# Patient Record
(demographics unavailable — no encounter records)

---

## 2024-11-27 NOTE — ASSESSMENT
[FreeTextEntry1] : Samuel Schnitzler is a 20 y/o M with history of medical weight loss who presents today for an initial consultation interested in bariatric surgery. Referred by pediatrician who has been following him aggressively for a long time. Patient states that he was previously on Ozempic about 3 years ago, for 4-5 months. Starting weight was 320 lbs, patient managed to lose 30 lbs with medical weight loss,and today weighs 313 lbs. Patient denies alcohol intake and smoking. Well educated and has a strong support system. We discussed at length surgical and non-surgical options and that non-surgical approaches are unlikely to lead to long term, sustained weight loss. We also discussed that surgery alone is unlikely to be successful but should rather be seen as a tool for weight loss to be integrated with physical activity and nutritional counseling. All risks of surgery were explained to the patient including the risks of leaks, infection, blood clots, and death. We will begin bariatric workup with blood work including testosterone and insulin to move forward with possible VSG.

## 2024-11-27 NOTE — HISTORY OF PRESENT ILLNESS
[de-identified] : Samuel Schnitzler is a 20 y/o M with history of medical weight loss who presents today for an initial consultation interested in bariatric surgery. Referred by pediatrician who has been following him aggressively for a long time. Patient states that he was previously on Ozempic about 3 years ago, for 4-5 months. Starting weight was 320 lbs, patient managed to lose 30 lbs with medical weight loss,and today weighs 313 lbs. Patient denies alcohol intake and smoking. Well educated and has a strong support system. We discussed at length surgical and non-surgical options and that non-surgical approaches are unlikely to lead to long term, sustained weight loss. We also discussed that surgery alone is unlikely to be successful but should rather be seen as a tool for weight loss to be integrated with physical activity and nutritional counseling. All risks of surgery were explained to the patient including the risks of leaks, infection, blood clots, and death. We will begin bariatric workup with blood work including testosterone and insulin to move forward with possible VSG.

## 2024-11-27 NOTE — PLAN
[FreeTextEntry1] : We will begin bariatric workup with blood work including testosterone and insulin to move forward with possible VSG.

## 2025-01-22 NOTE — HISTORY OF PRESENT ILLNESS
[Home] : at home, [unfilled] , at the time of the visit. [Medical Office: ([facility])___] : at the medical office located in  [de-identified] : Mr. Schnitzler presents today for a final visit for laparoscopic sleeve gastrectomy scheduled on 2/4/25. Alternatives, risks and benefits discussed. UGI reviewed, no contraindications to proposed surgery. Patient attended postoperative boot camp class. BSTOP, use and rationale of non-opioid medications for pain management explained, patient verbalized understanding.

## 2025-01-22 NOTE — ASSESSMENT
[FreeTextEntry1] : 19 year old male with hx of morbid obesity and insulin resistance presenting for a final visit for a laparoscopic sleeve gastrectomy. Patient verbalized understanding of nutrition education provided. All questions answered and preoperative instructions given,

## 2025-02-19 NOTE — ASSESSMENT
[FreeTextEntry1] : 20 y/o M having a stable post operative course 2 weeks s/p VSG. Tolerating stage II diet well and compliant with vitamins. Will follow up with dietician to advance diet as per guidelines. Follow up in 4-6 weeks.

## 2025-02-19 NOTE — PLAN
[FreeTextEntry1] : Will follow up with dietician to advance diet as per guidelines. Follow up in 4-6 weeks.

## 2025-02-19 NOTE — PHYSICAL EXAM
[Obese] : obese [Normal] : affect appropriate [de-identified] : incisions are healing with no sign of infection

## 2025-03-19 NOTE — ASSESSMENT
[FreeTextEntry1] : 19 year old male doing well 6 weeks s/p VSG. Denies any complains, tolerating a soft diet well, advancing to regular foods as tolerated. Compliant with vitamins and exercising. Advised to begin incorporating strength training to exercise regimen. No dietary questions for the dietitians at this time, will continue advancing as tolerated. Will follow up in 6 weeks and do labs.

## 2025-03-19 NOTE — HISTORY OF PRESENT ILLNESS
[Home] : at home, [unfilled] , at the time of the visit. [Medical Office: ([facility])___] : at the medical office located in  [This encounter was initiated by telehealth (audio with video) and converted to telephone (audio only)] : This encounter was initiated by telehealth (audio with video) and converted to telephone (audio only) [No access to tele-video equipment] : patient lacks access to tele-video equipment. [Verbal consent obtained from patient] : the patient, [unfilled] [de-identified] : Mr. Schnitzler presents today for a follow up visit 6 weeks s/p conversion of VSG to RYGB on 2/4/25. Patient reports he is doing well with no complains, he has lost ~40lbs since surgery. Denies abd pain, n/v, acid reflux or PO intolerance. Tolerating a mostly soft diet well, slowly advancing to regular solid foods as tolerated. Compliant with vitamin supplementation. Having regular bowel movements on average every other day. Advised to start increasing fiber in diet and ensuring adequate hydration. Exercising doing mostly cardio, instructed he is cleared to begin incorporating strength training as well.

## 2025-04-23 NOTE — ADDENDUM
[FreeTextEntry1] :  Documented by Mickie Mcdonald acting as a scribe for CARLOS MANUEL Beebe  on 04/23/2025  .

## 2025-04-23 NOTE — END OF VISIT
[Time Spent: ___ minutes] : I have spent [unfilled] minutes of time on the encounter which excludes teaching and separately reported services. [FreeTextEntry3] :  All medical record entries made by the Scribe were at my, CARLOS MANUEL Dunne , direction and personally dictated by me on 04/23/2025 . I have reviewed the chart and agree that the record accurately reflects my personal performance of the history, physical exam, assessment and plan. I have also personally directed, reviewed, and agreed with the chart.

## 2025-04-23 NOTE — PLAN
[FreeTextEntry1] : Increase protein intake. Incorporate resistance training to prevent complications. Will obtain blood work and have patient follow up in 3 months.

## 2025-04-23 NOTE — ASSESSMENT
[FreeTextEntry1] : Samuel Schnitzler is a 18 y/o M 10 weeks s/p laparoscopic sleeve gastrectomy who presents today for a follow up. Patient is doing well, and current diet consists of eggs, protein, but is unsure if he is meeting adequate protein intake. Reviewed the importance of adequate protein intake to prevent muscle loss. Patient is compliant with vitamins and denies difficulty with bowel movements. Further reports walking and biking exercise. Reinforced importance of resistance training to prevent complications. BMI today is 34 and patient has lost over 60 lbs since surgery. No other concerns at this time. Will obtain blood work and have patient follow up in 3 months.

## 2025-04-23 NOTE — HISTORY OF PRESENT ILLNESS
[de-identified] : Samuel Schnitzler is a 18 y/o M 10 weeks s/p laparoscopic sleeve gastrectomy who presents today for a follow up. Patient is doing well, and current diet consists of eggs, protein, but is unsure if he is meeting adequate protein intake. Reviewed the importance of adequate protein intake to prevent muscle loss. Patient is compliant with vitamins and denies difficulty with bowel movements. Further reports walking and biking exercise. Reinforced importance of resistance training to prevent complications. BMI today is 34 and patient has lost over 60 lbs since surgery. No other concerns at this time. Will obtain blood work and have patient follow up in 3 months.

## 2025-04-23 NOTE — REASON FOR VISIT
[Home] : at home, [unfilled] , at the time of the visit. [Medical Office: ([facility])___] : at the medical office located in  [Telehealth (audio & video)] : This visit was provided via telehealth using real-time 2-way audio visual technology. [Verbal consent obtained from patient] : the patient, [unfilled] [Follow-Up Visit] : a follow-up visit for [Morbid Obesity (BMI<40)] : morbid obesity (bmi<40) [S/P Bariatric Surgery] : s/p bariatric surgery